# Patient Record
Sex: FEMALE | Race: WHITE | NOT HISPANIC OR LATINO | Employment: OTHER | URBAN - METROPOLITAN AREA
[De-identification: names, ages, dates, MRNs, and addresses within clinical notes are randomized per-mention and may not be internally consistent; named-entity substitution may affect disease eponyms.]

---

## 2023-07-23 DIAGNOSIS — N60.91 ATYPICAL DUCTAL HYPERPLASIA OF RIGHT BREAST: Primary | ICD-10-CM

## 2023-07-23 PROBLEM — N60.12 DIFFUSE CYSTIC MASTOPATHY OF BOTH BREASTS: Status: ACTIVE | Noted: 2023-07-23

## 2023-07-23 PROBLEM — N60.11 DIFFUSE CYSTIC MASTOPATHY OF BOTH BREASTS: Status: ACTIVE | Noted: 2023-07-23

## 2023-07-23 NOTE — PROGRESS NOTES
[facility]  MD Mirela Hightower, NP-C    Chief Complaint:   Miroslava Ventura is a 63 y.o. female presenting today for  6 month follow up as part of our [facility]-Risk [facility]. She is due for Physical Examination and Mammogram  She reports no interval changes on her self-breast examination.     History of Present Illness:   Mrs. Ventura first presented on November 10, 2015 due to a right breast mass and sonocore biopsy showed benign changes and fibroadenomatoid nodules. Her imaging in June 2016 showed an architectural distortion in the right breast seen on [facility] only that was suspicious. Right stereotactic breast biopsy was benign. There were two areas of concern in the right breast in November 2018 that at core biopsies showed a Papillary Lesion and a Radial Scar. Excision showed Atypical Ductal Hyperplasia. MD:::Ariane Tyler MD.    Past Medical History:   Diagnosis Date    Anemia     Atypical ductal hyperplasia of breast     FOLLOWED BY  DR. VALLEJO    Atypical ductal hyperplasia of right breast 7/23/2023    Bronchitis     Classic migraine     Clotting disorder     Constipation     Diabetes mellitus     Diffuse cystic mastopathy of both breasts 7/23/2023    Hirsutism     Hypercholesterolemia     Hypothyroidism     Insulin resistance     Kidney stone     Muscle spasm     Sinusitis     Squamous cell carcinomatosis 2022    Nose    Thalassemia     BETA THAKASSEMIA MINOR    Thyroid disease     Von Willebrand's disease       Past Surgical History:   Procedure Laterality Date    BREAST BIOPSY  09/1980    benign breast bx - left breast, 11/2015 rt bx fibroadenoma and 06/2016- benign    BREAST CYST EXCISION      CARPAL TUNNEL RELEASE      CHOLECYSTECTOMY      COLONOSCOPY  2021    [facility] Dr [facility]    ELBOW SURGERY      EYE SURGERY      EYE MUSCLE SX FOR STRABISMUS    HEMORRHOID SURGERY      KIDNEY STONE SURGERY      SINUSOTOMY      DOROTA LSO  1991    TONSILLECTOMY       TUBAL LIGATION      ULNAR NERVE TRANSPOSITION          Current Outpatient Medications:     ascorbic acid, vitamin C, (VITAMIN C) 500 MG tablet, Take 500 mg by mouth., Disp: , Rfl:     cholecalciferol, vitamin D3, (VITAMIN D3) 25 mcg (1,000 unit) capsule, , Disp: , Rfl:     WILLOW 0.0375 mg/24 hr, Place 1 patch onto the skin twice a week., Disp: 24 patch, Rfl: 3    rosuvastatin (CRESTOR) 5 MG tablet, Take 1 tablet by mouth nightly., Disp: , Rfl:     SYNTHROID 75 mcg tablet, , Disp: , Rfl:     zinc sulfate (ZINC-15 ORAL), Take by mouth., Disp: , Rfl:    Review of patient's allergies indicates:   Allergen Reactions    Aspirin      Other reaction(s): Bleeding, free bleeder  Other reaction(s): cannot take d/t bleeding disorder      Social History     Tobacco Use    Smoking status: Never    Smokeless tobacco: Never   Substance Use Topics    Alcohol use: Yes     Alcohol/week: 1.0 standard drink of alcohol     Types: 1 Glasses of wine per week      Family History   Problem Relation Age of Onset    Von Willebrand disease Mother     Thalassemia Mother     Hypothyroidism Mother     Esophageal cancer Father 56    Lung cancer Father     Von Willebrand disease Sister     Von Willebrand disease Brother     Thalassemia Brother     Leukemia Maternal Grandmother 70    Diabetes Maternal Grandmother         Review of Systems   Integumentary:  Negative for color change, rash, mole/lesion, breast mass, breast discharge and breast tenderness.   Breast: Negative for mass and tenderness       Physical Exam   HENT:   Head: Normocephalic.   Pulmonary/Chest: Right breast exhibits no inverted nipple, no mass, no nipple discharge, no skin change and no tenderness. Left breast exhibits no inverted nipple, no mass, no nipple discharge, no skin change and no tenderness. No breast swelling.   Genitourinary: No breast swelling.   Musculoskeletal: Lymphadenopathy:      Upper Body:      Right upper body: No supraclavicular or axillary adenopathy.       Left upper body: No supraclavicular or axillary adenopathy.     Neurological: She is alert.      MAMMOGRAM REPORT: This procedure was performed using tomosynthesis. Computer-aided detection was utilized in the interpretation of this examination.The breasts are heterogeneously dense, which may obscure small masses. There is no evidence of suspicious masses, calcifications, or other abnormal findings.  Benign right breast postoperative architectural distortion and calcifications.     NOTE:::We viewed her films together at today's visit.  We discussed the multiple views obtained and the important findings.  Even benign changes were mentioned and her questions were answered.  She knows that she may receive a formal letter or report from the Radiologist.  She is to contact us if she has questions.      Assessment/Plan  1. Atypical ductal hyperplasia of right breast  Assessment & Plan:  Long discussion with the patient concerning her diagnosis of Atypical Ductal Hyperplasia and the risk that it carries over her lifetime. Currently, the risk of developing a breast cancer is thought to be as high as 30% over 25 years. She understands the need to be followed more aggressively in our [facility] every 6 months and is comfortable with this plan.    She understands that her imaging and exams have remained stable and is comfortable being followed in a conservative fashion. She understands the importance of monthly self-breast examination and knows to report any and all changes as they occur.          2. Diffuse cystic mastopathy of both breasts  Assessment & Plan:  We discussed our Fibrocystic Mastopathy Protocol in detail. She should take Vitamin E 800 IU everyday x 3 months or until non-tender then can stop Vitamin E vs. continue daily at 400 IU.  The use of ice packs or warms soaks to tender area of the breast may also be of some benefit.  If warm soaks help her tenderness - She can use Aspercreme (unless allergic to Aspirin) on the  affected area.  Ibuprofen (if no contraindications) at 800 mg three times per day for 5 days can also relieve many symptoms associated with swollen or inflamed tissue.  She can repeat Ibuprofen for 5 days, but then should be off for 5 days as it may cause gastric upset.  It is a good idea to wear a tight bra during the day and night to minimize movement of the tender area (Sports Bras work well).  Evening Primrose Oil can be bought over the counter and used at a dose of 3000 mg per day to help with any breast pain/tenderness not improved by implementing the above measures.               Medical Decision Making:  It is my impression that this patient suffers all conditions contained in this medical document.  Each of these conditions did affect our plan of care and my medical decision making today.  It is my opinion that the medical decision making concerning this patient was of moderate difficulty based on the aforementioned conditions.  Any further recommendations will be communicated to the patient by me.  I have reviewed and verified her allergies, list of medications, medical and surgical histories, social history, and a pertinent review of symptoms.      Follow up:  6 months and prn    For:  Ultrasound with Dr. Reyes                  11/3/2023 5:33 PM ADDENDUM:  I called her with her gene test that showed that a CHEK 2 mutation was not detected this is a negative test.  She is aware.

## 2023-07-23 NOTE — ASSESSMENT & PLAN NOTE
Long discussion with the patient concerning her diagnosis of Atypical Ductal Hyperplasia and the risk that it carries over her lifetime. Currently, the risk of developing a breast cancer is thought to be as high as 30% over 25 years. She understands the need to be followed more aggressively in our [facility] every 6 months and is comfortable with this plan.    She understands that her imaging and exams have remained stable and is comfortable being followed in a conservative fashion. She understands the importance of monthly self-breast examination and knows to report any and all changes as they occur.

## 2023-07-31 ENCOUNTER — OFFICE VISIT (OUTPATIENT)
Dept: SURGERY | Facility: CLINIC | Age: 63
End: 2023-07-31
Payer: COMMERCIAL

## 2023-07-31 DIAGNOSIS — N60.11 DIFFUSE CYSTIC MASTOPATHY OF BOTH BREASTS: ICD-10-CM

## 2023-07-31 DIAGNOSIS — N60.12 DIFFUSE CYSTIC MASTOPATHY OF BOTH BREASTS: ICD-10-CM

## 2023-07-31 DIAGNOSIS — N60.91 ATYPICAL DUCTAL HYPERPLASIA OF RIGHT BREAST: ICD-10-CM

## 2023-07-31 PROCEDURE — 1159F MED LIST DOCD IN RCRD: CPT | Mod: CPTII,S$GLB,, | Performed by: NURSE PRACTITIONER

## 2023-07-31 PROCEDURE — 1160F PR REVIEW ALL MEDS BY PRESCRIBER/CLIN PHARMACIST DOCUMENTED: ICD-10-PCS | Mod: CPTII,S$GLB,, | Performed by: NURSE PRACTITIONER

## 2023-07-31 PROCEDURE — 99214 PR OFFICE/OUTPT VISIT, EST, LEVL IV, 30-39 MIN: ICD-10-PCS | Mod: S$GLB,,, | Performed by: NURSE PRACTITIONER

## 2023-07-31 PROCEDURE — 1159F PR MEDICATION LIST DOCUMENTED IN MEDICAL RECORD: ICD-10-PCS | Mod: CPTII,S$GLB,, | Performed by: NURSE PRACTITIONER

## 2023-07-31 PROCEDURE — 99214 OFFICE O/P EST MOD 30 MIN: CPT | Mod: S$GLB,,, | Performed by: NURSE PRACTITIONER

## 2023-07-31 PROCEDURE — 1160F RVW MEDS BY RX/DR IN RCRD: CPT | Mod: CPTII,S$GLB,, | Performed by: NURSE PRACTITIONER

## 2023-10-16 ENCOUNTER — TELEPHONE (OUTPATIENT)
Dept: SURGERY | Facility: CLINIC | Age: 63
End: 2023-10-16
Payer: COMMERCIAL

## 2023-10-16 NOTE — TELEPHONE ENCOUNTER
----- Message from Martha Lima sent at 10/16/2023  3:33 PM CDT -----  Contact: Miroslava [place] is needing to check on the status of her labs as it has been almost 2 ago and she has yet to hear anything. Please advise at [phone]

## 2024-01-23 NOTE — PROGRESS NOTES
Date of Service: 2/7/2024    Chief Complaint:   Miroslava Ventura is a 63 y.o. female presenting today for her 6-month evaluation given her diagnosis of Atypical Ductal Hyperplasia. She is due for an ultrasound.  She reports no interval changes.     History of Present Illness:   Mrs. Ventura first presented on November 10, 2015 due to a right breast mass and sonocore biopsy showed benign changes and fibroadenomatoid nodules. Her imaging in June 2016 showed an architectural distortion in the right breast seen on [facility] only that was suspicious. Right stereotactic breast biopsy was benign. There were two areas of concern in the right breast in November 2018 that at core biopsies showed a Papillary Lesion and a Radial Scar. Excision showed Atypical Ductal Hyperplasia. MD:::Ariane Tyler MD.    Past Medical History:   Diagnosis Date    Anemia     Atypical ductal hyperplasia of breast     FOLLOWED BY  DR. VALLEJO    Atypical ductal hyperplasia of right breast 07/23/2023    Bronchitis     Classic migraine     Clotting disorder     Constipation     Diabetes mellitus     Diffuse cystic mastopathy of both breasts 07/23/2023    Endometriosis of uterus     Hirsutism     Hypercholesterolemia     Hypothyroidism     Insulin resistance     Kidney stone     Muscle spasm     Sinusitis     Squamous cell carcinomatosis 2022    Nose    Thalassemia     BETA THAKASSEMIA MINOR    Thyroid disease     Von Willebrand's disease       Past Surgical History:   Procedure Laterality Date    BREAST BIOPSY  09/1980    benign breast bx - left breast, 11/2015 rt bx fibroadenoma and 06/2016- benign    BREAST CYST EXCISION      CARPAL TUNNEL RELEASE      CHOLECYSTECTOMY      COLONOSCOPY  2021    [facility]    ELBOW SURGERY      EYE SURGERY      EYE MUSCLE SX FOR STRABISMUS    HEMORRHOID SURGERY      HYSTERECTOMY      KIDNEY STONE SURGERY      OOPHORECTOMY      SINUSOTOMY      DOROTA LSO  1991    TONSILLECTOMY      TUBAL LIGATION       ULNAR NERVE TRANSPOSITION          Current Outpatient Medications:     ascorbic acid, vitamin C, (VITAMIN C) 500 MG tablet, Take 500 mg by mouth., Disp: , Rfl:     cholecalciferol, vitamin D3, (VITAMIN D3) 25 mcg (1,000 unit) capsule, , Disp: , Rfl:     WILLOW 0.0375 mg/24 hr, Place 1 patch onto the skin twice a week., Disp: 24 patch, Rfl: 3    rosuvastatin (CRESTOR) 5 MG tablet, Take 1 tablet by mouth nightly., Disp: , Rfl:     SYNTHROID 75 mcg tablet, , Disp: , Rfl:     zinc sulfate (ZINC-15 ORAL), Take by mouth., Disp: , Rfl:    Review of patient's allergies indicates:   Allergen Reactions    Aspirin      Other reaction(s): Bleeding, free bleeder  Other reaction(s): cannot take d/t bleeding disorder      Social History     Tobacco Use    Smoking status: Former     Current packs/day: 0.00     Types: Cigarettes    Smokeless tobacco: Never   Substance Use Topics    Alcohol use: Yes      Family History   Problem Relation Age of Onset    Von Willebrand disease Mother     Thalassemia Mother     Hypothyroidism Mother     Esophageal cancer Father 56    Lung cancer Father     Cancer Father     Von Willebrand disease Sister     Von Willebrand disease Brother     Thalassemia Brother     Leukemia Maternal Grandmother 70    Diabetes Maternal Grandmother     Cancer Maternal Grandmother     Diabetes Paternal Grandmother         Review of Systems   Integumentary:  Negative for color change, rash, mole/lesion, thickening/swelling, dimpling of skin, drainage  Breast: Negative for mass and tenderness     Physical Exam   Constitutional: She appears well-developed. She is cooperative.   HENT: Normocephalic.   Cardiovascular:  Normal rate and regular rhythm.            Pulmonary/Chest: She exhibits no tenderness and no bony tenderness.   Abdominal: Soft. Normal appearance.   Musculoskeletal: Intact with no deficits  Neurological: She is alert.   Skin: No rash noted.   Breast and Chest Wall Evaluation:   Right breast exhibits no  mass, no nipple discharge, no skin change and no tenderness.     Left breast exhibits no mass, no nipple discharge, no skin change and no tenderness.     Lymphadenopathy: No supraclavicular or axillary adenopathy.    ULTRASOUND EVALUATION and REPORT    Bilateral real-time Ultrasound was performed by me.  All four quadrants of the breast, the subareolar and axillary basins were scanned.    She has some heterogeneous dense fibroglandular tissue bilaterally.  She has scattered fibrocystic changes bilaterally.    Right Breast: She has normal tissues in the right breast; there's no disruption of the tissue planes and no abnormal shadowing; she has normal skin thickness with no subcutaneous nodules of skin thickening; NEM     Left Breast: She has normal tissues in the left breast; there's no disruption of the tissue planes and no abnormal shadowing; she has normal skin thickness with no subcutaneous nodules or skin thickening; NEM     Axillae: There are no abnormal lymph nodes seen bilaterally.     Impression: Some dense but normal tissue bilaterally with no solid/suspicious masses noted. No LAD in bilateral axillae.  BIRADS: Category 2 - Benign Finding.    Findings were discussed with patient in real time and a hand written report was given to her at the conclusion of the exam.      ASSESSMENT and PLAN OF CARE     1. Atypical ductal hyperplasia of right breast  Assessment & Plan:  Long discussion with the patient concerning her diagnosis of Atypical Ductal Hyperplasia and the risk that it carries over her lifetime. Currently, the risk of developing a breast cancer is thought to be as high as 30% over 25 years. She understands the need to be followed more aggressively in our [facility] every 6 months and is comfortable with this plan.    She understands that her imaging and exams have remained stable and is comfortable being followed in a conservative fashion. She understands the importance of monthly self-breast examination and  knows to report any and all changes as they occur.        2. Diffuse cystic mastopathy of both breasts  Assessment & Plan:  We discussed our fibrocystic mastopathy protocol in detail. She knows that if she follows this protocol - that her symptoms should improve.  We discussed how breast pain is usually not associated with breast cancer, however, pain can be the presenting symptom with some cancers (but this could be coincidental). Still, if her pain does not improve in 8-12 weeks she should call us back for additional recommendations.        Medical Decision Making: It is my impression that the patient suffers from all the listed conditions.  Each of these conditions did affect my Plan of Care and all medical decisions that were rendered.  The medical decision making was of high difficulty based on her comorbidities and her previous diagnosis of Atypical Ductal Hyperplasia, which puts her in a high-risk category for the future risk of breast cancer.  I have reviewed all imaging and it has been discussed with her. I have reviewed and verified her allergies, list of medications, medical and surgical histories, social history and a pertinent review of symptoms.    Follow up: 6 months and prn     For:  OLLIE PERDUE (D) at [facility]

## 2024-02-07 ENCOUNTER — OFFICE VISIT (OUTPATIENT)
Dept: SURGERY | Facility: CLINIC | Age: 64
End: 2024-02-07
Payer: COMMERCIAL

## 2024-02-07 DIAGNOSIS — N60.91 ATYPICAL DUCTAL HYPERPLASIA OF RIGHT BREAST: Primary | ICD-10-CM

## 2024-02-07 DIAGNOSIS — N60.12 DIFFUSE CYSTIC MASTOPATHY OF BOTH BREASTS: ICD-10-CM

## 2024-02-07 DIAGNOSIS — N60.11 DIFFUSE CYSTIC MASTOPATHY OF BOTH BREASTS: ICD-10-CM

## 2024-02-07 PROCEDURE — 99999 PR PBB SHADOW E&M-EST. PATIENT-LVL II: CPT | Mod: PBBFAC,,, | Performed by: SPECIALIST

## 2024-02-07 PROCEDURE — 99214 OFFICE O/P EST MOD 30 MIN: CPT | Mod: S$GLB,,, | Performed by: SPECIALIST

## 2024-02-07 PROCEDURE — 1160F RVW MEDS BY RX/DR IN RCRD: CPT | Mod: CPTII,S$GLB,, | Performed by: SPECIALIST

## 2024-02-07 PROCEDURE — 1159F MED LIST DOCD IN RCRD: CPT | Mod: CPTII,S$GLB,, | Performed by: SPECIALIST

## 2024-02-07 PROCEDURE — 1126F AMNT PAIN NOTED NONE PRSNT: CPT | Mod: CPTII,S$GLB,, | Performed by: SPECIALIST

## 2024-07-29 ENCOUNTER — TELEPHONE (OUTPATIENT)
Dept: SURGERY | Facility: CLINIC | Age: 64
End: 2024-07-29
Payer: COMMERCIAL

## 2024-07-30 NOTE — TELEPHONE ENCOUNTER
----- Message from Lucretia Sadler MA sent at 7/29/2024 10:00 AM CDT -----  Regarding: bone density test  Patient  [phone] called asking if Dr. Reyes will order her bone density test the same day she is having her mammogram on August 14th at [facility]? Please call patient with answer.

## 2024-08-11 DIAGNOSIS — N60.91 ATYPICAL DUCTAL HYPERPLASIA OF RIGHT BREAST: Primary | ICD-10-CM

## 2024-08-22 NOTE — PROGRESS NOTES
[facility]  Denis Vallejo MD, FACS  Mirela Sky NP-C      Date of Service: 8/23/2024    Chief Complaint:   Miroslava Ventura is a 64 y.o. female presenting today for her 6 month evaluation given her diagnosis of Atypical Ductal Hyperplasia.  She is due for her annual mammogram.  She reports no interval changes.     History of Present Illness:   Mrs. Ventura first presented on November 10, 2015 due to a right breast mass and sonocore biopsy showed benign changes and fibroadenomatoid nodules. Her imaging in June 2016 showed an architectural distortion in the right breast seen on [facility] only that was suspicious. Right stereotactic breast biopsy was benign. There were two areas of concern in the right breast in November 2018 that at core biopsies showed a Papillary Lesion and a Radial Scar. Excision showed Atypical Ductal Hyperplasia. MD:::Ariane Tyler MD.     Past Medical History:   Diagnosis Date    Anemia     Atypical ductal hyperplasia of breast     FOLLOWED BY  DR. VALLEJO    Atypical ductal hyperplasia of right breast 07/23/2023    Bronchitis     Classic migraine     Clotting disorder     Constipation     Diabetes mellitus     Diffuse cystic mastopathy of both breasts 07/23/2023    Endometriosis of uterus     Hirsutism     Hypercholesterolemia     Hypothyroidism     Insulin resistance     Kidney stone     Muscle spasm     Sinusitis     Squamous cell carcinomatosis 2022    Nose    Thalassemia     BETA THAKASSEMIA MINOR    Thyroid disease     Von Willebrand's disease       Past Surgical History:   Procedure Laterality Date    BREAST BIOPSY  09/1980    benign breast bx - left breast, 11/2015 rt bx fibroadenoma and 06/2016- benign    BREAST CYST EXCISION      CARPAL TUNNEL RELEASE      CHOLECYSTECTOMY      COLONOSCOPY  2021    [facility]    ELBOW SURGERY      EYE SURGERY      EYE MUSCLE SX FOR STRABISMUS    HEMORRHOID SURGERY      HYSTERECTOMY      KIDNEY  STONE SURGERY      OOPHORECTOMY      SINUSOTOMY      DOROTA LSO  1991    TONSILLECTOMY      TUBAL LIGATION      ULNAR NERVE TRANSPOSITION          Current Outpatient Medications:     ascorbic acid, vitamin C, (VITAMIN C) 500 MG tablet, Take 500 mg by mouth., Disp: , Rfl:     cholecalciferol, vitamin D3, (VITAMIN D3) 25 mcg (1,000 unit) capsule, , Disp: , Rfl:     WILLOW 0.0375 mg/24 hr, Place 1 patch onto the skin twice a week., Disp: 24 patch, Rfl: 3    SYNTHROID 75 mcg tablet, , Disp: , Rfl:     zinc sulfate (ZINC-15 ORAL), Take by mouth., Disp: , Rfl:     rosuvastatin (CRESTOR) 5 MG tablet, Take 1 tablet by mouth nightly., Disp: , Rfl:    Review of patient's allergies indicates:   Allergen Reactions    Aspirin      Other reaction(s): Bleeding, free bleeder  Other reaction(s): cannot take d/t bleeding disorder      Social History     Tobacco Use    Smoking status: Former     Types: Cigarettes    Smokeless tobacco: Never   Substance Use Topics    Alcohol use: Yes      Family History   Problem Relation Name Age of Onset    Von Willebrand disease Mother      Thalassemia Mother      Hypothyroidism Mother      Esophageal cancer Father Margarito Reyna 56    Lung cancer Father Margarito Reyna     Cancer Father Margarito Reyna     Von Willebrand disease Sister      Von Willebrand disease Brother      Thalassemia Brother      Leukemia Maternal Grandmother Penny Elizabeth 70    Diabetes Maternal Grandmother Penny Elizabeth     Cancer Maternal Grandmother Penny Elizabeth     Diabetes Paternal Grandmother Hope Reyna         Review of Systems   Integumentary:  Negative for color change, rash, mole/lesion, breast mass, breast discharge and breast tenderness.   Breast: Negative for mass and tenderness       Physical Exam   HENT:   Head: Normocephalic.   Pulmonary/Chest: Right breast exhibits no inverted nipple, no mass, no nipple discharge, no skin change and no tenderness. Left breast exhibits no inverted nipple, no mass, no nipple discharge, no skin  change and no tenderness. No breast swelling.   Genitourinary: No breast swelling.   Musculoskeletal: Lymphadenopathy:      Upper Body:      Right upper body: No supraclavicular or axillary adenopathy.      Left upper body: No supraclavicular or axillary adenopathy.     Neurological: She is alert.        MAMMOGRAM REPORT: She has some diffuse fibronodular tissue; there are no spiculated lesions, distortions or suspicious calcifications noted; NEM    ASSESSMENT and PLAN OF CARE     1. Atypical ductal hyperplasia of right breast  Assessment & Plan:  Long discussion with the patient concerning her diagnosis of Atypical Ductal Hyperplasia and the risk that it carries over her lifetime. Currently, the risk of developing a breast cancer is thought to be as high as 30% over 25 years. She understands the need to be followed more aggressively in our [facility] every 6 months and is comfortable with this plan.    She understands that her imaging and exams have remained stable and is comfortable being followed in a conservative fashion. She understands the importance of monthly self-breast examination and knows to report any and all changes as they occur.          2. Diffuse cystic mastopathy of both breasts  Assessment & Plan:  We discussed our fibrocystic mastopathy protocol in detail. She knows that if she follows this protocol - that her symptoms should improve.  We discussed how breast pain is usually not associated with breast cancer, however, pain can be the presenting symptom with some cancers (but this could be coincidental). Still, if her pain does not improve in 8-12 weeks she should call us back for additional recommendations.        Medical Decision Making: It is my impression that the patient suffers from all the listed conditions.  Each of these conditions did affect my Plan of Care and all medical decisions that were rendered.  The medical decision making was of high difficulty based on her comorbidities and her  previous diagnosis of Atypical Ductal Hyperplasia, which puts her in a high-risk category for the future risk of breast cancer.  I have reviewed all imaging and it has been discussed with her. I have reviewed and verified her allergies, list of medications, medical and surgical histories, social history and a pertinent review of symptoms.    Follow up:  6 months and prn    For:  PE and US with Dr. Reyes

## 2024-08-23 ENCOUNTER — OFFICE VISIT (OUTPATIENT)
Dept: SURGERY | Facility: CLINIC | Age: 64
End: 2024-08-23
Payer: COMMERCIAL

## 2024-08-23 DIAGNOSIS — N60.91 ATYPICAL DUCTAL HYPERPLASIA OF RIGHT BREAST: Primary | ICD-10-CM

## 2024-08-23 DIAGNOSIS — N60.12 DIFFUSE CYSTIC MASTOPATHY OF BOTH BREASTS: ICD-10-CM

## 2024-08-23 DIAGNOSIS — N60.11 DIFFUSE CYSTIC MASTOPATHY OF BOTH BREASTS: ICD-10-CM

## 2024-08-23 PROCEDURE — 99999 PR PBB SHADOW E&M-EST. PATIENT-LVL III: CPT | Mod: PBBFAC,,, | Performed by: NURSE PRACTITIONER

## 2025-01-09 ENCOUNTER — TELEPHONE (OUTPATIENT)
Dept: SURGERY | Facility: CLINIC | Age: 65
End: 2025-01-09
Payer: COMMERCIAL

## 2025-01-09 NOTE — TELEPHONE ENCOUNTER
Spoke with pt regarding cancellation of future appt with Dr. Reyes due to shelter; pt verbally stated she will follow up with GYN Dr. Tyler for all future imaging needs and she will contact Dr. Rea as well just to keep in the loop; pt did not have any questions or concerns prior to call ending.